# Patient Record
Sex: MALE | Race: WHITE | NOT HISPANIC OR LATINO | Employment: FULL TIME | URBAN - METROPOLITAN AREA
[De-identification: names, ages, dates, MRNs, and addresses within clinical notes are randomized per-mention and may not be internally consistent; named-entity substitution may affect disease eponyms.]

---

## 2018-11-16 ENCOUNTER — OFFICE VISIT (OUTPATIENT)
Dept: URGENT CARE | Facility: CLINIC | Age: 23
End: 2018-11-16
Payer: COMMERCIAL

## 2018-11-16 VITALS
TEMPERATURE: 97.7 F | SYSTOLIC BLOOD PRESSURE: 137 MMHG | BODY MASS INDEX: 28.93 KG/M2 | OXYGEN SATURATION: 98 % | RESPIRATION RATE: 18 BRPM | WEIGHT: 250 LBS | DIASTOLIC BLOOD PRESSURE: 84 MMHG | HEART RATE: 92 BPM | HEIGHT: 78 IN

## 2018-11-16 DIAGNOSIS — H00.014 HORDEOLUM EXTERNUM OF LEFT UPPER EYELID: Primary | ICD-10-CM

## 2018-11-16 PROCEDURE — G0382 LEV 3 HOSP TYPE B ED VISIT: HCPCS | Performed by: EMERGENCY MEDICINE

## 2018-11-16 RX ORDER — ERYTHROMYCIN 5 MG/G
0.5 OINTMENT OPHTHALMIC
Qty: 3.5 G | Refills: 0 | Status: SHIPPED | OUTPATIENT
Start: 2018-11-16 | End: 2018-11-23

## 2018-11-16 NOTE — PROGRESS NOTES
[facility] [facility]        NAME: Alok Lilly is a 20 y o  male  : 1995    MRN: [id number]  DATE: 2018  TIME: 3:50 PM    Assessment and Plan   Hordeolum externum of left upper eyelid [H00 [id number]]  1  Hordeolum externum of left upper eyelid  gentamicin (GENTAK) 0 3 % ophthalmic ointment         Patient Instructions     Patient Instructions   Stye   WHAT YOU NEED TO KNOW:   A stye is a lump on the edge or inside of your eyelid caused by an infection  A stye can form on your upper or lower eyelid  It usually goes away in 2 to 4 days  DISCHARGE INSTRUCTIONS:   Medicines:   · Antibiotic medicine: This is given as an ointment to put into your eye  It is used to fight an infection caused by bacteria  Use as directed  · Take your medicine as directed  Contact your healthcare provider if you think your medicine is not helping or if you have side effects  Tell him or her if you are allergic to any medicine  Keep a list of the medicines, vitamins, and herbs you take  Include the amounts, and when and why you take them  Bring the list or the pill bottles to follow-up visits  Carry your medicine list with you in case of an emergency  Follow up with your healthcare provider as directed:  Write down your questions so you remember to ask them during your visits  Self-care:   · Use warm compresses: This will help decrease swelling and pain  Wet a clean washcloth with warm water and place it on your eye for 10 to 15 minutes, 3 to 4 times each day or as directed  · Keep your hands away from your eye: This helps to prevent the spread of infection to other parts of the eye  Wash your hands often with soap and dry with a clean towel  Do not squeeze the stye  · Do not use eye makeup:  Do not wear eye makeup while you have a stye  Eye makeup may carry bacteria and cause another stye  Throw away eye makeup and brushes used to apply the makeup  Use new eye makeup after the stye has gone away   Do not share eye makeup with others  · Prevent another stye:  Wash your face and clean your eyelashes every day  Remove eye makeup with makeup remover  This helps to completely remove eye makeup without heavy rubbing  Contact your healthcare provider if:   · You have redness and discharge around your eye, and your eye pain is getting worse  · Your vision changes  · The stye has not gone away within 7 days  · The stye comes back within a short period of time after treatment  · You have questions or concerns about your condition or care  © 2017 [employer] Information is for End User's use only and may not be sold, redistributed or otherwise used for commercial purposes  All illustrations and images included in CareNotes® are the copyrighted property of [employer]  or [employer]  The above information is an  only  It is not intended as medical advice for individual conditions or treatments  Talk to your doctor, nurse or pharmacist before following any medical regimen to see if it is safe and effective for you  Follow up with PCP in 3-5 days  Proceed to  ER if symptoms worsen  Chief Complaint     Chief Complaint   Patient presents with    Eye Swelling     Left eye swelling  Denies pain or itching  Onset 4 days ago         History of Present Illness       Patient complains of painless red and swollen left upper eyelid for 1 day  He denies trauma  He denies itch  He denies similar symptoms in the past         Review of Systems   Review of Systems   Constitutional: Negative for chills and fever  Eyes: Negative for pain, redness and visual disturbance  Skin: Negative for rash  Neurological: Negative for headaches           Current Medications       Current Outpatient Prescriptions:     gentamicin (GENTAK) 0 3 % ophthalmic ointment, Administer 0 5 inches into the left eye 3 (three) times a day for 7 days, Disp: 3 5 g, Rfl: 0    Current Allergies Allergies as of 11/16/2018 - Reviewed 11/16/2018   Allergen Reaction Noted    Azithromycin  11/16/2018    Sulfa antibiotics  11/16/2018            The following portions of the patient's history were reviewed and updated as appropriate: allergies, current medications, past family history, past medical history, past social history, past surgical history and problem list      History reviewed  No pertinent past medical history  Past Surgical History:   Procedure Laterality Date    ORIF TIBIA FRACTURE      TONSILLECTOMY         History reviewed  No pertinent family history  Medications have been verified  Objective   /84   Pulse 92   Temp 97 7 °F (36 5 °C) (Tympanic)   Resp 18   Ht 6' 6" (1 981 m)   Wt 113 kg (250 lb)   SpO2 98%   BMI 28 89 kg/m²        Physical Exam     Physical Exam   Constitutional: He is oriented to person, place, and time  He appears well-developed and well-nourished  No distress  Eyes: Pupils are equal, round, and reactive to light  EOM are normal  Right conjunctiva is not injected  Right conjunctiva has no hemorrhage  Left conjunctiva is not injected  Left conjunctiva has no hemorrhage  Left upper lid red and swollen but no tenderness  No I and D'able lesion   Cardiovascular: Normal rate and regular rhythm  Neurological: He is alert and oriented to person, place, and time  Skin: Skin is warm and dry  No rash noted  Nursing note and vitals reviewed

## 2018-11-16 NOTE — PATIENT INSTRUCTIONS
Stye   WHAT YOU NEED TO KNOW:   A stye is a lump on the edge or inside of your eyelid caused by an infection  A stye can form on your upper or lower eyelid  It usually goes away in 2 to 4 days  DISCHARGE INSTRUCTIONS:   Medicines:   · Antibiotic medicine: This is given as an ointment to put into your eye  It is used to fight an infection caused by bacteria  Use as directed  · Take your medicine as directed  Contact your healthcare provider if you think your medicine is not helping or if you have side effects  Tell him or her if you are allergic to any medicine  Keep a list of the medicines, vitamins, and herbs you take  Include the amounts, and when and why you take them  Bring the list or the pill bottles to follow-up visits  Carry your medicine list with you in case of an emergency  Follow up with your healthcare provider as directed:  Write down your questions so you remember to ask them during your visits  Self-care:   · Use warm compresses: This will help decrease swelling and pain  Wet a clean washcloth with warm water and place it on your eye for 10 to 15 minutes, 3 to 4 times each day or as directed  · Keep your hands away from your eye: This helps to prevent the spread of infection to other parts of the eye  Wash your hands often with soap and dry with a clean towel  Do not squeeze the stye  · Do not use eye makeup:  Do not wear eye makeup while you have a stye  Eye makeup may carry bacteria and cause another stye  Throw away eye makeup and brushes used to apply the makeup  Use new eye makeup after the stye has gone away  Do not share eye makeup with others  · Prevent another stye:  Wash your face and clean your eyelashes every day  Remove eye makeup with makeup remover  This helps to completely remove eye makeup without heavy rubbing  Contact your healthcare provider if:   · You have redness and discharge around your eye, and your eye pain is getting worse      · Your vision changes  · The stye has not gone away within 7 days  · The stye comes back within a short period of time after treatment  · You have questions or concerns about your condition or care  © 2017 [employer] Information is for End User's use only and may not be sold, redistributed or otherwise used for commercial purposes  All illustrations and images included in CareNotes® are the copyrighted property of [employer]  or [employer]  The above information is an  only  It is not intended as medical advice for individual conditions or treatments  Talk to your doctor, nurse or pharmacist before following any medical regimen to see if it is safe and effective for you

## 2020-03-02 ENCOUNTER — TELEPHONE (OUTPATIENT)
Dept: FAMILY MEDICINE CLINIC | Facility: CLINIC | Age: 25
End: 2020-03-02

## 2020-03-16 NOTE — TELEPHONE ENCOUNTER
03/15/20 9:01 PM     Thank you for your request  Your request has been received, reviewed, and the patient chart updated  The PCP has successfully been removed with a patient attribution note  This message will now be completed      Thank you  Tyson Onofre

## 2020-07-05 ENCOUNTER — OFFICE VISIT (OUTPATIENT)
Dept: URGENT CARE | Facility: CLINIC | Age: 25
End: 2020-07-05
Payer: OTHER GOVERNMENT

## 2020-07-05 VITALS
RESPIRATION RATE: 16 BRPM | WEIGHT: 250 LBS | OXYGEN SATURATION: 100 % | TEMPERATURE: 98.4 F | HEIGHT: 78 IN | HEART RATE: 112 BPM | BODY MASS INDEX: 28.93 KG/M2

## 2020-07-05 DIAGNOSIS — R50.9 FEVER, UNSPECIFIED FEVER CAUSE: Primary | ICD-10-CM

## 2020-07-05 DIAGNOSIS — Z11.59 SCREENING FOR VIRAL DISEASE: ICD-10-CM

## 2020-07-05 PROCEDURE — U0003 INFECTIOUS AGENT DETECTION BY NUCLEIC ACID (DNA OR RNA); SEVERE ACUTE RESPIRATORY SYNDROME CORONAVIRUS 2 (SARS-COV-2) (CORONAVIRUS DISEASE [COVID-19]), AMPLIFIED PROBE TECHNIQUE, MAKING USE OF HIGH THROUGHPUT TECHNOLOGIES AS DESCRIBED BY CMS-2020-01-R: HCPCS | Performed by: NURSE PRACTITIONER

## 2020-07-05 PROCEDURE — G0382 LEV 3 HOSP TYPE B ED VISIT: HCPCS | Performed by: NURSE PRACTITIONER

## 2020-07-05 RX ORDER — ACETAMINOPHEN 325 MG/1
650 TABLET ORAL EVERY 6 HOURS PRN
COMMUNITY

## 2020-07-05 NOTE — PROGRESS NOTES
NAME: Amber Contreras is a 30 y o  male  : 1995    MRN: [id number]    Pulse (!) 112   Temp 98 4 °F (36 9 °C) (Tympanic)   Resp 16   Ht 6' 6" (1 981 m)   Wt 113 kg (250 lb)   SpO2 100%   BMI 28 89 kg/m²     Assessment and Plan   Fever, unspecified fever cause [R50 9]  1  Fever, unspecified fever cause  MISCELLANEOUS LAB TEST    MISCELLANEOUS LAB TEST   2  Screening for viral disease         Zeb Minor was seen today for covid-19  Diagnoses and all orders for this visit:    Fever, unspecified fever cause  -     MISCELLANEOUS LAB TEST; Future  -     MISCELLANEOUS LAB TEST    Screening for viral disease        Patient Instructions   There are no Patient Instructions on file for this visit  Proceed to ER if symptoms worsen  Chief Complaint     Chief Complaint   Patient presents with   [place] COVID-19     has a fever of 102 since last night; fatigued         History of Present Illness     25-year-old male here today for COVID testing after experiencing having a fever 102 to since last night and is feeling fatigued  He states that he is a  has not work since April, has not been on vacation has not been around anyone who was positive COVID  He denies having any chest pain shortness of breath at this time and denies having any headache sinus pressure sore throat  He has not had any abdominal pain nausea vomiting or diarrhea  He last took Tylenol when he woke up  Review of Systems   Review of Systems   Constitutional: Positive for fatigue and fever  Negative for activity change  HENT: Negative  Respiratory: Negative  Cardiovascular: Negative  Gastrointestinal: Negative  Musculoskeletal: Negative            Current Medications       Current Outpatient Medications:     acetaminophen (TYLENOL) 325 mg tablet, Take 650 mg by mouth every 6 (six) hours as needed for mild pain, Disp: , Rfl:     Current Allergies     Allergies as of 2020 - Reviewed 2020   Allergen Reaction Noted  Azithromycin  11/16/2018    Sulfa antibiotics  11/16/2018              Past Medical History:   Diagnosis Date    Known health problems: none        Past Surgical History:   Procedure Laterality Date    ORIF TIBIA FRACTURE      TONSILLECTOMY         Family History   Problem Relation Age of Onset    No Known Problems Mother     Cancer Father          Medications have been verified  The following portions of the patient's history were reviewed and updated as appropriate: allergies, current medications, past family history, past medical history, past social history, past surgical history and problem list     Objective   Pulse (!) 112   Temp 98 4 °F (36 9 °C) (Tympanic)   Resp 16   Ht 6' 6" (1 981 m)   Wt 113 kg (250 lb)   SpO2 100%   BMI 28 89 kg/m²      Physical Exam     Physical Exam   Constitutional: He is oriented to person, place, and time  He appears well-developed and well-nourished  He is cooperative  He does not appear ill  No distress  HENT:   Mouth/Throat: Uvula is midline and mucous membranes are normal  No posterior oropharyngeal edema or posterior oropharyngeal erythema  Tonsils are 0 on the right  Tonsils are 0 on the left  No tonsillar exudate  Cardiovascular: Tachycardia present  Pulmonary/Chest: Effort normal    Neurological: He is alert and oriented to person, place, and time  Psychiatric: He has a normal mood and affect   His speech is normal and behavior is normal        SHILPA Lan

## 2020-07-11 LAB — SARS-COV-2 RNA SPEC QL NAA+PROBE: NOT DETECTED

## 2020-07-12 ENCOUNTER — TELEPHONE (OUTPATIENT)
Dept: OTHER | Facility: OTHER | Age: 25
End: 2020-07-12

## 2020-07-12 NOTE — RESULT ENCOUNTER NOTE
Patient was called for COVID-19 test results  A voicemail was received, but it was full and no message could be left

## 2020-07-12 NOTE — TELEPHONE ENCOUNTER
The patient was called for notification of a NEGATIVE test result for COVID-19  The patient did not answer the phone and a voicemail was full

## 2020-07-13 NOTE — TELEPHONE ENCOUNTER
The patient was called for notification of a NEGATIVE test result for COVID-19  The patient did not answer the phone and the voicemail was full  Called the emergency contact, Rosa Chang, and received a busy tone

## 2020-07-14 ENCOUNTER — TELEPHONE (OUTPATIENT)
Dept: URGENT CARE | Facility: CLINIC | Age: 25
End: 2020-07-14

## 2020-07-14 NOTE — TELEPHONE ENCOUNTER
Patient called office with concern of ongoing fever for 11 days  Patient was seen in office on 7/5  Patient tested negative for coronavirus  He continues to have fevers around 100 0 and fatigued and is taking over-the-counter DayQuil and NyQuil  Patient advised he is more than welcome to be re-evaluated in the urgent care or he should make an appoint with his family doctor  Patient educated to seek evaluation emergency room if he has chest pain, shortness of breath, difficulty breathing  Patient verbalized understanding

## 2023-12-04 ENCOUNTER — APPOINTMENT (EMERGENCY)
Dept: RADIOLOGY | Facility: HOSPITAL | Age: 28
End: 2023-12-04

## 2023-12-04 ENCOUNTER — HOSPITAL ENCOUNTER (EMERGENCY)
Facility: HOSPITAL | Age: 28
Discharge: HOME | End: 2023-12-04
Attending: STUDENT IN AN ORGANIZED HEALTH CARE EDUCATION/TRAINING PROGRAM

## 2023-12-04 VITALS
HEART RATE: 102 BPM | WEIGHT: 265 LBS | OXYGEN SATURATION: 97 % | DIASTOLIC BLOOD PRESSURE: 89 MMHG | SYSTOLIC BLOOD PRESSURE: 144 MMHG | TEMPERATURE: 101 F | BODY MASS INDEX: 30.62 KG/M2 | RESPIRATION RATE: 18 BRPM

## 2023-12-04 DIAGNOSIS — R50.9 FEVER: Primary | ICD-10-CM

## 2023-12-04 DIAGNOSIS — J01.00 ACUTE NON-RECURRENT MAXILLARY SINUSITIS: ICD-10-CM

## 2023-12-04 LAB
ALBUMIN SERPL BCP-MCNC: 4.4 G/DL (ref 3.5–5)
ALP SERPL-CCNC: 61 U/L (ref 34–104)
ALT SERPL W P-5'-P-CCNC: 42 U/L (ref 7–52)
ANION GAP SERPL CALCULATED.3IONS-SCNC: 6 MMOL/L
AST SERPL W P-5'-P-CCNC: 18 U/L (ref 13–39)
BACTERIA UR QL AUTO: NORMAL /HPF
BASOPHILS # BLD AUTO: 0.03 THOUSANDS/ÂΜL (ref 0–0.1)
BASOPHILS NFR BLD AUTO: 0 % (ref 0–1)
BILIRUB SERPL-MCNC: 1.43 MG/DL (ref 0.2–1)
BILIRUB UR QL STRIP: NEGATIVE
BUN SERPL-MCNC: 18 MG/DL (ref 5–25)
CALCIUM SERPL-MCNC: 9.2 MG/DL (ref 8.4–10.2)
CHLORIDE SERPL-SCNC: 100 MMOL/L (ref 96–108)
CLARITY UR: CLEAR
CO2 SERPL-SCNC: 28 MMOL/L (ref 21–32)
COLOR UR: YELLOW
CREAT SERPL-MCNC: 1.08 MG/DL (ref 0.6–1.3)
EOSINOPHIL # BLD AUTO: 0.04 THOUSAND/ÂΜL (ref 0–0.61)
EOSINOPHIL NFR BLD AUTO: 1 % (ref 0–6)
ERYTHROCYTE [DISTWIDTH] IN BLOOD BY AUTOMATED COUNT: 11.9 % (ref 11.6–15.1)
FLUAV RNA RESP QL NAA+PROBE: NEGATIVE
FLUBV RNA RESP QL NAA+PROBE: NEGATIVE
GFR SERPL CREATININE-BSD FRML MDRD: 91 ML/MIN/1.73SQ M
GLUCOSE SERPL-MCNC: 96 MG/DL (ref 65–140)
GLUCOSE UR STRIP-MCNC: NEGATIVE MG/DL
HCT VFR BLD AUTO: 44.5 % (ref 36.5–49.3)
HGB BLD-MCNC: 15.3 G/DL (ref 12–17)
HGB UR QL STRIP.AUTO: ABNORMAL
IMM GRANULOCYTES # BLD AUTO: 0.03 THOUSAND/UL (ref 0–0.2)
IMM GRANULOCYTES NFR BLD AUTO: 0 % (ref 0–2)
KETONES UR STRIP-MCNC: NEGATIVE MG/DL
LEUKOCYTE ESTERASE UR QL STRIP: NEGATIVE
LYMPHOCYTES # BLD AUTO: 1.36 THOUSANDS/ÂΜL (ref 0.6–4.47)
LYMPHOCYTES NFR BLD AUTO: 17 % (ref 14–44)
MCH RBC QN AUTO: 29.1 PG (ref 26.8–34.3)
MCHC RBC AUTO-ENTMCNC: 34.4 G/DL (ref 31.4–37.4)
MCV RBC AUTO: 85 FL (ref 82–98)
MONOCYTES # BLD AUTO: 0.89 THOUSAND/ÂΜL (ref 0.17–1.22)
MONOCYTES NFR BLD AUTO: 11 % (ref 4–12)
NEUTROPHILS # BLD AUTO: 5.57 THOUSANDS/ÂΜL (ref 1.85–7.62)
NEUTS SEG NFR BLD AUTO: 71 % (ref 43–75)
NITRITE UR QL STRIP: NEGATIVE
NON-SQ EPI CELLS URNS QL MICRO: NORMAL /HPF
NRBC BLD AUTO-RTO: 0 /100 WBCS
PH UR STRIP.AUTO: 6.5 [PH]
PLATELET # BLD AUTO: 220 THOUSANDS/UL (ref 149–390)
PMV BLD AUTO: 9.1 FL (ref 8.9–12.7)
POTASSIUM SERPL-SCNC: 4.4 MMOL/L (ref 3.5–5.3)
PROCALCITONIN SERPL-MCNC: 0.38 NG/ML
PROT SERPL-MCNC: 7.2 G/DL (ref 6.4–8.4)
PROT UR STRIP-MCNC: NEGATIVE MG/DL
RBC # BLD AUTO: 5.26 MILLION/UL (ref 3.88–5.62)
RBC #/AREA URNS AUTO: NORMAL /HPF
RSV RNA RESP QL NAA+PROBE: NEGATIVE
S PYO DNA THROAT QL NAA+PROBE: NOT DETECTED
SARS-COV-2 RNA RESP QL NAA+PROBE: NEGATIVE
SODIUM SERPL-SCNC: 134 MMOL/L (ref 135–147)
SP GR UR STRIP.AUTO: <=1.005 (ref 1–1.03)
UROBILINOGEN UR QL STRIP.AUTO: 0.2 E.U./DL
WBC # BLD AUTO: 7.92 THOUSAND/UL (ref 4.31–10.16)
WBC #/AREA URNS AUTO: NORMAL /HPF

## 2023-12-04 PROCEDURE — 0241U HB NFCT DS VIR RESP RNA 4 TRGT: CPT | Performed by: STUDENT IN AN ORGANIZED HEALTH CARE EDUCATION/TRAINING PROGRAM

## 2023-12-04 PROCEDURE — 80053 COMPREHEN METABOLIC PANEL: CPT | Performed by: STUDENT IN AN ORGANIZED HEALTH CARE EDUCATION/TRAINING PROGRAM

## 2023-12-04 PROCEDURE — 93005 ELECTROCARDIOGRAM TRACING: CPT

## 2023-12-04 PROCEDURE — 81001 URINALYSIS AUTO W/SCOPE: CPT | Performed by: STUDENT IN AN ORGANIZED HEALTH CARE EDUCATION/TRAINING PROGRAM

## 2023-12-04 PROCEDURE — 84145 PROCALCITONIN (PCT): CPT | Performed by: STUDENT IN AN ORGANIZED HEALTH CARE EDUCATION/TRAINING PROGRAM

## 2023-12-04 PROCEDURE — 86665 EPSTEIN-BARR CAPSID VCA: CPT | Performed by: STUDENT IN AN ORGANIZED HEALTH CARE EDUCATION/TRAINING PROGRAM

## 2023-12-04 PROCEDURE — 99284 EMERGENCY DEPT VISIT MOD MDM: CPT | Performed by: STUDENT IN AN ORGANIZED HEALTH CARE EDUCATION/TRAINING PROGRAM

## 2023-12-04 PROCEDURE — 96374 THER/PROPH/DIAG INJ IV PUSH: CPT

## 2023-12-04 PROCEDURE — 86663 EPSTEIN-BARR ANTIBODY: CPT | Performed by: STUDENT IN AN ORGANIZED HEALTH CARE EDUCATION/TRAINING PROGRAM

## 2023-12-04 PROCEDURE — 99284 EMERGENCY DEPT VISIT MOD MDM: CPT

## 2023-12-04 PROCEDURE — 86664 EPSTEIN-BARR NUCLEAR ANTIGEN: CPT | Performed by: STUDENT IN AN ORGANIZED HEALTH CARE EDUCATION/TRAINING PROGRAM

## 2023-12-04 PROCEDURE — 71046 X-RAY EXAM CHEST 2 VIEWS: CPT

## 2023-12-04 PROCEDURE — 36415 COLL VENOUS BLD VENIPUNCTURE: CPT | Performed by: STUDENT IN AN ORGANIZED HEALTH CARE EDUCATION/TRAINING PROGRAM

## 2023-12-04 PROCEDURE — 96361 HYDRATE IV INFUSION ADD-ON: CPT

## 2023-12-04 PROCEDURE — 87651 STREP A DNA AMP PROBE: CPT | Performed by: STUDENT IN AN ORGANIZED HEALTH CARE EDUCATION/TRAINING PROGRAM

## 2023-12-04 PROCEDURE — 85025 COMPLETE CBC W/AUTO DIFF WBC: CPT | Performed by: STUDENT IN AN ORGANIZED HEALTH CARE EDUCATION/TRAINING PROGRAM

## 2023-12-04 RX ORDER — AMOXICILLIN AND CLAVULANATE POTASSIUM 875; 125 MG/1; MG/1
1 TABLET, FILM COATED ORAL ONCE
Status: COMPLETED | OUTPATIENT
Start: 2023-12-04 | End: 2023-12-04

## 2023-12-04 RX ORDER — AMOXICILLIN AND CLAVULANATE POTASSIUM 875; 125 MG/1; MG/1
1 TABLET, FILM COATED ORAL EVERY 12 HOURS
Qty: 13 TABLET | Refills: 0 | Status: SHIPPED | OUTPATIENT
Start: 2023-12-04 | End: 2023-12-11

## 2023-12-04 RX ORDER — ACETAMINOPHEN 325 MG/1
975 TABLET ORAL ONCE
Status: COMPLETED | OUTPATIENT
Start: 2023-12-04 | End: 2023-12-04

## 2023-12-04 RX ORDER — KETOROLAC TROMETHAMINE 30 MG/ML
15 INJECTION, SOLUTION INTRAMUSCULAR; INTRAVENOUS ONCE
Status: COMPLETED | OUTPATIENT
Start: 2023-12-04 | End: 2023-12-04

## 2023-12-04 RX ADMIN — ACETAMINOPHEN 975 MG: 325 TABLET, FILM COATED ORAL at 21:28

## 2023-12-04 RX ADMIN — KETOROLAC TROMETHAMINE 15 MG: 30 INJECTION, SOLUTION INTRAMUSCULAR; INTRAVENOUS at 20:37

## 2023-12-04 RX ADMIN — SODIUM CHLORIDE 1000 ML: 0.9 INJECTION, SOLUTION INTRAVENOUS at 20:35

## 2023-12-04 RX ADMIN — AMOXICILLIN AND CLAVULANATE POTASSIUM 1 TABLET: 875; 125 TABLET, FILM COATED ORAL at 22:10

## 2023-12-05 NOTE — DISCHARGE INSTRUCTIONS
You tested negative for COVID/influenza/RSV/Strep. You are being prescribed a course of Augmentin. Take as directed. Ibuprofen 600 mg every 6 hours and Tylenol 1000 mg every 6 hours recommended for pain/fever. Drink plenty of clear/hydrating fluids over the next few days. Return to the ED if your symptoms worsen.

## 2023-12-05 NOTE — ED PROVIDER NOTES
History  Chief Complaint   Patient presents with    Flu Symptoms     Patient presents to the ED with complaints of flu symptoms since Thursday and an episode of chest pain that occurred today. He states his chest still feels tight. History provided by:  Patient  Flu Symptoms  Presenting symptoms: cough, fatigue, fever, headache, myalgias, nausea, rhinorrhea and vomiting    Presenting symptoms: no diarrhea, no shortness of breath and no sore throat    Severity:  Moderate  Onset quality:  Gradual  Duration:  6 days  Progression:  Worsening  Chronicity:  New (Worsening flu-like symptoms x 6 days. Taking OTC meds without much relief. Expresses N/V, decreased oral intake. Did not receive influenza/COVID vaccines. No known sick contacts.)  Relieved by:  Nothing  Worsened by:  Nothing  Ineffective treatments:  OTC medications  Associated symptoms: chills, decreased appetite, decreased physical activity and nasal congestion    Associated symptoms: no neck stiffness      Prior to Admission Medications   Prescriptions Last Dose Informant Patient Reported? Taking?   acetaminophen (TYLENOL) 325 mg tablet   Yes No   Sig: Take 650 mg by mouth every 6 (six) hours as needed for mild pain      Facility-Administered Medications: None       Past Medical History:   Diagnosis Date    Known health problems: none        Past Surgical History:   Procedure Laterality Date    ORIF TIBIA FRACTURE      TONSILLECTOMY         Family History   Problem Relation Age of Onset    No Known Problems Mother     Cancer Father      I have reviewed and agree with the history as documented.     E-Cigarette/Vaping    E-Cigarette Use Never User      E-Cigarette/Vaping Substances     Social History     Tobacco Use    Smoking status: Never    Smokeless tobacco: Never   Vaping Use    Vaping Use: Never used   Substance Use Topics    Alcohol use: Yes     Comment: social    Drug use: No       Review of Systems   Constitutional:  Positive for activity change, appetite change, chills, decreased appetite, fatigue and fever. HENT:  Positive for congestion, rhinorrhea, sinus pressure and sinus pain. Negative for sore throat. Respiratory:  Positive for cough. Negative for chest tightness and shortness of breath. Gastrointestinal:  Positive for nausea and vomiting. Negative for abdominal pain and diarrhea. Musculoskeletal:  Positive for arthralgias and myalgias. Negative for back pain, neck pain and neck stiffness. Skin:  Negative for color change, pallor, rash and wound. Neurological:  Positive for light-headedness and headaches. Negative for dizziness, syncope and weakness. Hematological:  Does not bruise/bleed easily. All other systems reviewed and are negative. Physical Exam  Physical Exam  Vitals and nursing note reviewed. Constitutional:       General: He is not in acute distress. Appearance: He is ill-appearing. He is not toxic-appearing. HENT:      Head: Normocephalic and atraumatic. Right Ear: Tympanic membrane, ear canal and external ear normal.      Left Ear: Tympanic membrane, ear canal and external ear normal.      Nose: Congestion present. No rhinorrhea. Right Sinus: Maxillary sinus tenderness present. No frontal sinus tenderness. Left Sinus: Maxillary sinus tenderness present. No frontal sinus tenderness. Mouth/Throat:      Mouth: Mucous membranes are moist.      Pharynx: Posterior oropharyngeal erythema present. No oropharyngeal exudate. Comments: Uvula midline. No exudates. Eyes:      General: No scleral icterus. Right eye: No discharge. Left eye: No discharge. Extraocular Movements: Extraocular movements intact. Conjunctiva/sclera: Conjunctivae normal.   Cardiovascular:      Rate and Rhythm: Normal rate and regular rhythm. Pulses: Normal pulses. Heart sounds: Normal heart sounds. No murmur heard.   Pulmonary:      Effort: Pulmonary effort is normal. No respiratory distress. Breath sounds: Normal breath sounds. No stridor. No wheezing, rhonchi or rales. Chest:      Chest wall: No tenderness. Abdominal:      General: Bowel sounds are normal.      Palpations: Abdomen is soft. Tenderness: There is no abdominal tenderness. There is no right CVA tenderness, left CVA tenderness, guarding or rebound. Musculoskeletal:         General: No swelling or tenderness. Cervical back: Neck supple. No tenderness. Lymphadenopathy:      Cervical: No cervical adenopathy. Skin:     General: Skin is warm and dry. Capillary Refill: Capillary refill takes less than 2 seconds. Coloration: Skin is not jaundiced or pale. Findings: No bruising, erythema, lesion or rash. Neurological:      General: No focal deficit present. Mental Status: He is alert and oriented to person, place, and time.    Psychiatric:         Behavior: Behavior normal.       Vital Signs  ED Triage Vitals   Temperature Pulse Respirations Blood Pressure SpO2   12/04/23 2004 12/04/23 2004 12/04/23 2004 12/04/23 2004 12/04/23 2004   (!) 100.7 °F (38.2 °C) (!) 107 18 159/93 98 %      Temp Source Heart Rate Source Patient Position - Orthostatic VS BP Location FiO2 (%)   12/04/23 2004 12/04/23 2004 12/04/23 2057 12/04/23 2057 --   Oral Monitor Lying Left arm       Pain Score       12/04/23 2004       6           Vitals:    12/04/23 2004 12/04/23 2057 12/04/23 2107 12/04/23 2208   BP: 159/93 125/82 132/80 144/89   Pulse: (!) 107 (!) 108 (!) 110 102   Patient Position - Orthostatic VS:  Lying Lying Lying         Visual Acuity      ED Medications  Medications   sodium chloride 0.9 % bolus 1,000 mL (0 mL Intravenous Stopped 12/4/23 2213)   ketorolac (TORADOL) injection 15 mg (15 mg Intravenous Given 12/4/23 2037)   acetaminophen (TYLENOL) tablet 975 mg (975 mg Oral Given 12/4/23 2128)   amoxicillin-clavulanate (AUGMENTIN) 875-125 mg per tablet 1 tablet (1 tablet Oral Given 12/4/23 2210) Diagnostic Studies  Results Reviewed       Procedure Component Value Units Date/Time    UA w Reflex to Microscopic w Reflex to Culture [[id number]]  (Abnormal) Collected: 12/04/23 2220    Lab Status: Final result Specimen: Urine, Clean Catch Updated: 12/04/23 2225     Color, UA Yellow     Clarity, UA Clear     Specific Gravity, UA <=1.005     pH, UA 6.5     Leukocytes, UA Negative     Nitrite, UA Negative     Protein, UA Negative mg/dl      Glucose, UA Negative mg/dl      Ketones, UA Negative mg/dl      Urobilinogen, UA 0.2 E.U./dl      Bilirubin, UA Negative     Occult Blood, UA Trace-Intact    Urine Microscopic [[id number]] Collected: 12/04/23 2220    Lab Status: In process Specimen: Urine, Clean Catch Updated: 12/04/23 2225    Procalcitonin [[id number]]     Lab Status: No result Specimen: Blood     Strep A PCR [[id number]]  (Normal) Collected: 12/04/23 2126    Lab Status: Final result Specimen: Throat Updated: 12/04/23 2158     STREP A PCR Not Detected    EBV acute panel [[id number]] Collected: 12/04/23 2126    Lab Status: In process Specimen: Blood from Arm, Right Updated: 12/04/23 2127    FLU/RSV/COVID - if FLU/RSV clinically relevant [[id number]]  (Normal) Collected: 12/04/23 2032    Lab Status: Final result Specimen: Nares from Nasopharyngeal Swab Updated: 12/04/23 2113     SARS-CoV-2 Negative     INFLUENZA A PCR Negative     INFLUENZA B PCR Negative     RSV PCR Negative    Narrative:      FOR PEDIATRIC PATIENTS - copy/paste COVID Guidelines URL to browser: [email or web address]    SARS-CoV-2 assay is a Nucleic Acid Amplification assay intended for the  qualitative detection of nucleic acid from SARS-CoV-2 in nasopharyngeal  swabs. Results are for the presumptive identification of SARS-CoV-2 RNA.     Positive results are indicative of infection with SARS-CoV-2, the virus  causing COVID-19, but do not rule out bacterial infection or co-infection  with other viruses. Laboratories within the [facility] and its  territories are required to report all positive results to the appropriate  public health authorities. Negative results do not preclude SARS-CoV-2  infection and should not be used as the sole basis for treatment or other  patient management decisions. Negative results must be combined with  clinical observations, patient history, and epidemiological information. This test has not been FDA cleared or approved. This test has been authorized by FDA under an Emergency Use Authorization  (EUA). This test is only authorized for the duration of time the  declaration that circumstances exist justifying the authorization of the  emergency use of an in vitro diagnostic tests for detection of SARS-CoV-2  virus and/or diagnosis of COVID-19 infection under section 564(b)(1) of  the Act, 21 U. S.C. 503AWR-7(U)(9), unless the authorization is terminated  or revoked sooner. The test has been validated but independent review by FDA  and CLIA is pending. Test performed using [employer] GeneXpert: This RT-PCR assay targets N2,  a region unique to SARS-CoV-2. A conserved region in the E-gene was chosen  for pan-Sarbecovirus detection which includes SARS-CoV-2. According to CMS-2020-01-R, this platform meets the definition of high-throughput technology.     Comprehensive metabolic panel [[id number]]  (Abnormal) Collected: 12/04/23 2032    Lab Status: Final result Specimen: Blood from Arm, Right Updated: 12/04/23 2104     Sodium 134 mmol/L      Potassium 4.4 mmol/L      Chloride 100 mmol/L      CO2 28 mmol/L      ANION GAP 6 mmol/L      BUN 18 mg/dL      Creatinine 1.08 mg/dL      Glucose 96 mg/dL      Calcium 9.2 mg/dL      AST 18 U/L      ALT 42 U/L      Alkaline Phosphatase 61 U/L      Total Protein 7.2 g/dL      Albumin 4.4 g/dL      Total Bilirubin 1.43 mg/dL      eGFR 91 ml/min/1.73sq m     Narrative:      [place] guidelines for Chronic Kidney Disease (CKD):     Stage 1 with normal or high GFR (GFR > 90 mL/min/1.73 square meters)    Stage 2 Mild CKD (GFR = 60-89 mL/min/1.73 square meters)    Stage 3A Moderate CKD (GFR = 45-59 mL/min/1.73 square meters)    Stage 3B Moderate CKD (GFR = 30-44 mL/min/1.73 square meters)    Stage 4 Severe CKD (GFR = 15-29 mL/min/1.73 square meters)    Stage 5 End Stage CKD (GFR <15 mL/min/1.73 square meters)  Note: GFR calculation is accurate only with a steady state creatinine    CBC and differential [[id number]] Collected: 12/04/23 2032    Lab Status: Final result Specimen: Blood from Arm, Right Updated: 12/04/23 2038     WBC 7.92 Thousand/uL      RBC 5.26 Million/uL      Hemoglobin 15.3 g/dL      Hematocrit 44.5 %      MCV 85 fL      MCH 29.1 pg      MCHC 34.4 g/dL      RDW 11.9 %      MPV 9.1 fL      Platelets 147 Thousands/uL      nRBC 0 /100 WBCs      Neutrophils Relative 71 %      Immat GRANS % 0 %      Lymphocytes Relative 17 %      Monocytes Relative 11 %      Eosinophils Relative 1 %      Basophils Relative 0 %      Neutrophils Absolute 5.57 Thousands/µL      Immature Grans Absolute 0.03 Thousand/uL      Lymphocytes Absolute 1.36 Thousands/µL      Monocytes Absolute 0.89 Thousand/µL      Eosinophils Absolute 0.04 Thousand/µL      Basophils Absolute 0.03 Thousands/µL                    XR chest 2 views   ED Interpretation by Krupa Falcon DO (12/04 2055)   No acute findings. Procedures  Procedures         ED Course  ED Course as of 12/04/23 2228   Mon Dec 04, 2023   2039 No leukocytosis. Hemoglobin is within normal limits. Normal platelet count. 2055 CXR wo acute findings    2106 Mild hyponatremia. No other significant abnormalities. Renal function is within normal limits. 2158 Strep negative. SBIRT 20yo+      Flowsheet Row Most Recent Value   Initial Alcohol Screen: US AUDIT-C     1.  How often do you have a drink containing alcohol? 0 Filed at: 12/04/2023 2003 2. How many drinks containing alcohol do you have on a typical day you are drinking? 0 Filed at: 12/04/2023 2003   3a. Male UNDER 65: How often do you have five or more drinks on one occasion? 0 Filed at: 12/04/2023 2003   Audit-C Score 0 Filed at: 12/04/2023 2003   JAYLEN: How many times in the past year have you. .. Used an illegal drug or used a prescription medication for non-medical reasons? Never Filed at: 12/04/2023 2003                      Medical Decision Making  The differential diagnoses include but are not limited to strep pharyngitis, viral illness, pneumonia, sinusitis, RPA, PTA, urinary tract infection  Vital signs reviewed. Febrile upon arrival and throughout the course of treatment. Toradol/APAP, IVF administered. Expresses worsening flu-like symptoms x 6-7 days. Denies known tick exposures/rashes, symptoms of UTI. RVP/GAS negative. CXR without signs of PNA. EBV pending. The patient was prescribed a course of Augmentin. First dose administered in the ED. Recommendations/return precautions were discussed with the patient. All questions addressed. Stable for discharge. Problems Addressed:  Acute non-recurrent maxillary sinusitis: acute illness or injury  Fever: acute illness or injury    Amount and/or Complexity of Data Reviewed  External Data Reviewed: notes. Labs: ordered. Decision-making details documented in ED Course. Radiology: ordered and independent interpretation performed. Decision-making details documented in ED Course. Risk  OTC drugs. Prescription drug management.              Disposition  Final diagnoses:   Fever   Acute non-recurrent maxillary sinusitis     Time reflects when diagnosis was documented in both MDM as applicable and the Disposition within this note       Time User Action Codes Description Comment    12/4/2023 10:03 PM Irene Cameron Add [R50.9] Fever     12/4/2023 10:03 PM Irene Cameron Add [J01.00] Acute non-recurrent maxillary sinusitis           ED Disposition       ED Disposition   Discharge    Condition   Stable    Date/Time   Mon Dec 4, [phone] [id number] [facility] discharge to home/self care. Follow-up Information    None         Discharge Medication List as of 12/4/2023 10:09 PM        START taking these medications    Details   amoxicillin-clavulanate (AUGMENTIN) 875-125 mg per tablet Take 1 tablet by mouth every 12 (twelve) hours for 7 days, Starting Mon 12/4/2023, Until Mon 12/11/2023, Normal           CONTINUE these medications which have NOT CHANGED    Details   acetaminophen (TYLENOL) 325 mg tablet Take 650 mg by mouth every 6 (six) hours as needed for mild pain, Historical Med             No discharge procedures on file.     PDMP Review       None            ED Provider  Electronically Signed by             Brenda Francis DO  12/04/23 1402

## 2023-12-06 LAB
ATRIAL RATE: 106 BPM
EBV NA IGG SER IA-ACNC: <18 U/ML (ref 0–17.9)
EBV VCA IGG SER IA-ACNC: 143 U/ML (ref 0–17.9)
EBV VCA IGM SER IA-ACNC: <36 U/ML (ref 0–35.9)
INTERPRETATION: ABNORMAL
P AXIS: 53 DEGREES
PR INTERVAL: 146 MS
QRS AXIS: -30 DEGREES
QRSD INTERVAL: 92 MS
QT INTERVAL: 304 MS
QTC INTERVAL: 403 MS
T WAVE AXIS: 69 DEGREES
VENTRICULAR RATE: 106 BPM